# Patient Record
Sex: FEMALE | Race: WHITE | NOT HISPANIC OR LATINO | URBAN - METROPOLITAN AREA
[De-identification: names, ages, dates, MRNs, and addresses within clinical notes are randomized per-mention and may not be internally consistent; named-entity substitution may affect disease eponyms.]

---
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
## 2022-01-09 ENCOUNTER — HEALTH MAINTENANCE LETTER (OUTPATIENT)
Age: 22
End: 2022-01-09

## 2022-11-20 ENCOUNTER — HEALTH MAINTENANCE LETTER (OUTPATIENT)
Age: 22
End: 2022-11-20

## 2023-01-01 ASSESSMENT — ENCOUNTER SYMPTOMS
PALPITATIONS: 0
SORE THROAT: 0
HEADACHES: 0
DIZZINESS: 0
WEAKNESS: 0
HEARTBURN: 0
HEMATURIA: 0
NERVOUS/ANXIOUS: 0
MYALGIAS: 0
ABDOMINAL PAIN: 0
PARESTHESIAS: 0
EYE PAIN: 0
HEMATOCHEZIA: 0
DYSURIA: 0
CONSTIPATION: 0
JOINT SWELLING: 0
FREQUENCY: 0
ARTHRALGIAS: 0
NAUSEA: 0
BREAST MASS: 0
SHORTNESS OF BREATH: 0
FEVER: 0
COUGH: 0
CHILLS: 0
DIARRHEA: 0

## 2023-01-06 ENCOUNTER — OFFICE VISIT (OUTPATIENT)
Dept: FAMILY MEDICINE | Facility: CLINIC | Age: 23
End: 2023-01-06
Payer: COMMERCIAL

## 2023-01-06 VITALS
TEMPERATURE: 98.9 F | DIASTOLIC BLOOD PRESSURE: 78 MMHG | WEIGHT: 138.5 LBS | HEIGHT: 70 IN | HEART RATE: 87 BPM | RESPIRATION RATE: 14 BRPM | OXYGEN SATURATION: 98 % | SYSTOLIC BLOOD PRESSURE: 130 MMHG | BODY MASS INDEX: 19.83 KG/M2

## 2023-01-06 DIAGNOSIS — Z00.00 ANNUAL PHYSICAL EXAM: Primary | ICD-10-CM

## 2023-01-06 PROCEDURE — 99395 PREV VISIT EST AGE 18-39: CPT | Performed by: FAMILY MEDICINE

## 2023-01-06 ASSESSMENT — PAIN SCALES - GENERAL: PAINLEVEL: NO PAIN (0)

## 2023-01-06 NOTE — PROGRESS NOTES
"Assessment/Plan     Preventive.  Patient declines STI screening.  Reviewed with patient that she will be due for a Pap smear starting at age 21.    Upper respiratory congestion.  Suspect viral syndrome.  Observe.    ----  Chief complaint: Physical    Social History     Social History Narrative    Grew up in [place].  Lives with parents and younger brother.  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     Patient walks and rides bike for exercise.    She no longer has a Nexplanon.  She felt it was causing irregular bleeding.  She feels that she is not at risk of pregnancy at this time because she does not have male partners.    Patient had an upper respiratory infection around [facility].  Most of her symptoms resolved, but she continued to have a dry cough.  Upon returning to [place] for winter break, the cough worsened.  She feels that a humidifier has helped to relieve the cough.    On January 3, her upper respiratory congestion recurred.  She has also been experiencing a headache and a sensation of swollen lymph nodes in the neck.  She had a temperature of 100.4 Fahrenheit at home.  She denies recent COVID or flu exposure.  She took a home COVID test on Tuesday, 3 days ago, and it was negative.  She denies a history of seasonal allergies.    Exam  /78   Pulse 87   Temp 98.9  F (37.2  C) (Temporal)   Resp 14   Ht 1.776 m (5' 9.92\")   Wt 62.8 kg (138 lb 8 oz)   LMP 12/11/2022 (Within Days)   SpO2 98%   BMI 19.92 kg/m    Patient's last menstrual period was 12/11/2022 (within days).    TMs appear normal bilaterally.  Inflamed nasal passages.  No maxillary or frontal sinus tenderness.  Oropharynx without abnormal masses.  Lung fields clear to auscultation bilaterally.  Heart with regular rate and rhythm, no murmurs.  "

## 2023-01-07 PROBLEM — D69.6 THROMBOCYTOPENIA (H): Status: RESOLVED | Noted: 2020-01-15 | Resolved: 2023-01-07

## 2023-01-07 PROBLEM — Z78.9 HISTORY OF FOREIGN TRAVEL: Status: RESOLVED | Noted: 2017-11-03 | Resolved: 2023-01-07

## 2023-10-19 PROBLEM — B00.1 COLD SORE: Status: ACTIVE | Noted: 2023-10-19

## 2023-11-09 ENCOUNTER — MYC MEDICAL ADVICE (OUTPATIENT)
Dept: FAMILY MEDICINE | Facility: CLINIC | Age: 23
End: 2023-11-09
Payer: COMMERCIAL

## 2024-01-04 ENCOUNTER — PATIENT OUTREACH (OUTPATIENT)
Dept: CARE COORDINATION | Facility: CLINIC | Age: 24
End: 2024-01-04
Payer: COMMERCIAL

## 2024-01-18 ENCOUNTER — PATIENT OUTREACH (OUTPATIENT)
Dept: CARE COORDINATION | Facility: CLINIC | Age: 24
End: 2024-01-18
Payer: COMMERCIAL

## 2024-04-13 ENCOUNTER — HEALTH MAINTENANCE LETTER (OUTPATIENT)
Age: 24
End: 2024-04-13

## 2025-04-19 ENCOUNTER — HEALTH MAINTENANCE LETTER (OUTPATIENT)
Age: 25
End: 2025-04-19